# Patient Record
Sex: MALE | Race: WHITE | URBAN - METROPOLITAN AREA
[De-identification: names, ages, dates, MRNs, and addresses within clinical notes are randomized per-mention and may not be internally consistent; named-entity substitution may affect disease eponyms.]

---

## 2019-05-31 ENCOUNTER — APPOINTMENT (RX ONLY)
Dept: URBAN - METROPOLITAN AREA CLINIC 31 | Facility: CLINIC | Age: 49
Setting detail: DERMATOLOGY
End: 2019-05-31

## 2019-05-31 DIAGNOSIS — L81.4 OTHER MELANIN HYPERPIGMENTATION: ICD-10-CM

## 2019-05-31 DIAGNOSIS — D22 MELANOCYTIC NEVI: ICD-10-CM

## 2019-05-31 PROBLEM — D22.5 MELANOCYTIC NEVI OF TRUNK: Status: ACTIVE | Noted: 2019-05-31

## 2019-05-31 PROBLEM — D48.5 NEOPLASM OF UNCERTAIN BEHAVIOR OF SKIN: Status: ACTIVE | Noted: 2019-05-31

## 2019-05-31 PROBLEM — F41.9 ANXIETY DISORDER, UNSPECIFIED: Status: ACTIVE | Noted: 2019-05-31

## 2019-05-31 PROCEDURE — 99202 OFFICE O/P NEW SF 15 MIN: CPT | Mod: 25

## 2019-05-31 PROCEDURE — 11102 TANGNTL BX SKIN SINGLE LES: CPT

## 2019-05-31 PROCEDURE — ? BIOPSY BY SHAVE METHOD

## 2019-05-31 PROCEDURE — ? COUNSELING

## 2019-05-31 ASSESSMENT — LOCATION SIMPLE DESCRIPTION DERM
LOCATION SIMPLE: RIGHT UPPER BACK
LOCATION SIMPLE: UPPER BACK

## 2019-05-31 ASSESSMENT — LOCATION DETAILED DESCRIPTION DERM
LOCATION DETAILED: RIGHT INFERIOR MEDIAL UPPER BACK
LOCATION DETAILED: INFERIOR THORACIC SPINE

## 2019-05-31 ASSESSMENT — LOCATION ZONE DERM: LOCATION ZONE: TRUNK

## 2019-05-31 NOTE — PROCEDURE: BIOPSY BY SHAVE METHOD
X Size Of Lesion In Cm: 0
Depth Of Biopsy: dermis
Electrodesiccation Text: The wound bed was treated with electrodesiccation after the biopsy was performed.
Render In Bullet Format When Appropriate: No
Wound Care: Vaseline
Cryotherapy Text: The wound bed was treated with cryotherapy after the biopsy was performed.
Anesthesia Type: 1% lidocaine with epinephrine
Consent: Written consent was obtained and risks were reviewed including but not limited to scarring, infection, bleeding, scabbing, incomplete removal, nerve damage and allergy to anesthesia.
Lab: -50
Curettage Text: The wound bed was treated with curettage after the biopsy was performed.
Detail Level: Detailed
Biopsy Method: Dermablade
Notification Instructions: Patient will be notified of biopsy results. However, patient instructed to call the office if not contacted within 2 weeks.
Was A Bandage Applied: Yes
Hemostasis: Aluminum Chloride
Silver Nitrate Text: The wound bed was treated with silver nitrate after the biopsy was performed.
Post-Care Instructions: I reviewed with the patient in detail post-care instructions. Patient is to keep the biopsy site dry overnight, and then apply bacitracin twice daily until healed. Patient may apply hydrogen peroxide soaks to remove any crusting.
Dressing: bandage
Electrodesiccation And Curettage Text: The wound bed was treated with electrodesiccation and curettage after the biopsy was performed.
Anesthesia Volume In Cc (Will Not Render If 0): 0.5
Type Of Destruction Used: Curettage
Biopsy Type: H and E
Billing Type: Third-Party Bill

## 2019-06-24 ENCOUNTER — APPOINTMENT (RX ONLY)
Dept: URBAN - METROPOLITAN AREA CLINIC 26 | Facility: CLINIC | Age: 49
Setting detail: DERMATOLOGY
End: 2019-06-24

## 2019-06-24 VITALS — DIASTOLIC BLOOD PRESSURE: 78 MMHG | SYSTOLIC BLOOD PRESSURE: 138 MMHG | HEART RATE: 77 BPM

## 2019-06-24 PROBLEM — C44.391 OTHER SPECIFIED MALIGNANT NEOPLASM OF SKIN OF NOSE: Status: ACTIVE | Noted: 2019-06-24

## 2019-06-24 PROCEDURE — ? MOHS SURGERY

## 2019-06-24 PROCEDURE — 17311 MOHS 1 STAGE H/N/HF/G: CPT

## 2019-06-24 PROCEDURE — 15260 FTH/GFT FR N/E/E/L 20 SQCM/<: CPT

## 2019-06-24 NOTE — PROCEDURE: MOHS SURGERY
Show Mohs Rapid Report Variable In The Stage Tabs (Ensure You Have This How You Like Before You Hide): No

## 2019-06-24 NOTE — PROCEDURE: MOHS SURGERY
Pre-Op Test Results (Optional): Germline mutation testing for [place] Rob[place] Syndrome was discussed in detail with the patient and offered.  I explained that additional testing and cancer screening studies would be needed in the event of a positive test for the germline mutation.  The patient wishes to have this performed via IHC staining of his biopsy specimen.  We contacted the pathology laboratory to order this testing. Pre-Op Test Results (Optional): Germline mutation testing for [place] Robbie Syndrome was discussed in detail with the patient and offered.  I explained that additional testing and cancer screening studies would be needed in the event of a positive test for the germline mutation.  The patient wishes to have this performed via IHC staining of his biopsy specimen.  We contacted the pathology laboratory to order this testing.

## 2019-06-24 NOTE — PROCEDURE: MOHS SURGERY
Ftsg Text: The defect edges were debeveled with a #15 scalpel blade.  Given the location of the defect, shape of the defect and the proximity to free margins a full thickness skin graft was deemed most appropriate.  Using a sterile surgical marker, the primary defect shape was transferred to the donor site. The area thus outlined was incised deep to adipose tissue with a #15 scalpel blade.  The harvested graft was then trimmed of adipose tissue until only dermis and epidermis was left. The skin graft was then placed in the primary defect and oriented appropriately.

## 2019-06-24 NOTE — PROCEDURE: MIPS QUALITY
Detail Level: Detailed
Quality 130: Documentation Of Current Medications In The Medical Record: Current Medications Documented
Quality 47: Advance Care Plan: Advance Care Planning discussed and documented in the medical record; patient did not wish or was not able to name a surrogate decision maker or provide an advance care plan.
Quality 110: Preventive Care And Screening: Influenza Immunization: Influenza Immunization not Administered because Patient Refused.
Quality 431: Preventive Care And Screening: Unhealthy Alcohol Use - Screening: Patient screened for unhealthy alcohol use using a single question and scores less than 2 times per year
Quality 402: Tobacco Use And Help With Quitting Among Adolescents: Patient screened for tobacco and is an ex-smoker

## 2019-06-24 NOTE — PROCEDURE: MOHS SURGERY
Authored by Resident/PA/NP Cheek-To-Nose Interpolation Flap Text: A decision was made to reconstruct the defect utilizing an interpolation axial flap and a staged reconstruction.  A telfa template was made of the defect.  This telfa template was then used to outline the Cheek-To-Nose Interpolation flap.  The donor area for the pedicle flap was then injected with anesthesia.  The flap was excised through the skin and subcutaneous tissue down to the layer of the underlying musculature.  The interpolation flap was carefully excised within this deep plane to maintain its blood supply.  The edges of the donor site were undermined.   The donor site was closed in a primary fashion.  The pedicle was then rotated into position and sutured.  Once the tube was sutured into place, adequate blood supply was confirmed with blanching and refill.  The pedicle was then wrapped with xeroform gauze and dressed appropriately with a telfa and gauze bandage to ensure continued blood supply and protect the attached pedicle.

## 2019-07-01 ENCOUNTER — APPOINTMENT (RX ONLY)
Dept: URBAN - METROPOLITAN AREA CLINIC 26 | Facility: CLINIC | Age: 49
Setting detail: DERMATOLOGY
End: 2019-07-01

## 2019-07-01 PROBLEM — C44.391 OTHER SPECIFIED MALIGNANT NEOPLASM OF SKIN OF NOSE: Status: ACTIVE | Noted: 2019-07-01

## 2019-07-01 PROCEDURE — ? SUTURE REMOVAL (GLOBAL PERIOD)

## 2019-07-01 PROCEDURE — 99024 POSTOP FOLLOW-UP VISIT: CPT

## 2019-07-01 NOTE — PROCEDURE: SUTURE REMOVAL (GLOBAL PERIOD)
Add 05133 Cpt? (Important Note: In 2017 The Use Of 59039 Is Being Tracked By Cms To Determine Future Global Period Reimbursement For Global Periods): yes
Detail Level: Detailed

## 2019-08-07 ENCOUNTER — APPOINTMENT (RX ONLY)
Dept: URBAN - METROPOLITAN AREA CLINIC 26 | Facility: CLINIC | Age: 49
Setting detail: DERMATOLOGY
End: 2019-08-07

## 2019-08-07 PROBLEM — C44.391 OTHER SPECIFIED MALIGNANT NEOPLASM OF SKIN OF NOSE: Status: ACTIVE | Noted: 2019-08-07

## 2019-08-07 PROCEDURE — ? POST-OP WOUND CHECK

## 2019-08-07 PROCEDURE — 99024 POSTOP FOLLOW-UP VISIT: CPT

## 2019-08-07 NOTE — PROCEDURE: POST-OP WOUND CHECK
Additional Comments: Patient advised he can start massaging the area to help improve aesthetic outcome.  Germline mutation testing performed on diagnostic biopsy specimen was negative, making MTS unlikely.  No further testing warranted.  Results given to patient by phone after he left the office.
Add 21293 Cpt? (Important Note: In 2017 The Use Of 52222 Is Being Tracked By Cms To Determine Future Global Period Reimbursement For Global Periods): yes
Wound Evaluated By: Jeffery De Leon M.D.
Detail Level: Detailed